# Patient Record
Sex: MALE | Race: WHITE | NOT HISPANIC OR LATINO | Employment: FULL TIME | URBAN - METROPOLITAN AREA
[De-identification: names, ages, dates, MRNs, and addresses within clinical notes are randomized per-mention and may not be internally consistent; named-entity substitution may affect disease eponyms.]

---

## 2018-12-24 ENCOUNTER — HOSPITAL ENCOUNTER (OUTPATIENT)
Dept: RADIOLOGY | Facility: MEDICAL CENTER | Age: 33
End: 2018-12-24

## 2018-12-24 ENCOUNTER — HOSPITAL ENCOUNTER (EMERGENCY)
Facility: MEDICAL CENTER | Age: 33
End: 2018-12-24
Attending: EMERGENCY MEDICINE
Payer: COMMERCIAL

## 2018-12-24 ENCOUNTER — APPOINTMENT (OUTPATIENT)
Dept: RADIOLOGY | Facility: MEDICAL CENTER | Age: 33
End: 2018-12-24
Attending: EMERGENCY MEDICINE
Payer: COMMERCIAL

## 2018-12-24 VITALS
RESPIRATION RATE: 11 BRPM | WEIGHT: 238 LBS | HEIGHT: 73 IN | DIASTOLIC BLOOD PRESSURE: 65 MMHG | OXYGEN SATURATION: 95 % | TEMPERATURE: 97.5 F | SYSTOLIC BLOOD PRESSURE: 108 MMHG | HEART RATE: 111 BPM | BODY MASS INDEX: 31.54 KG/M2

## 2018-12-24 DIAGNOSIS — S82.862A CLOSED DISPLACED MAISONNEUVE FRACTURE OF LEFT LOWER EXTREMITY, INITIAL ENCOUNTER: ICD-10-CM

## 2018-12-24 DIAGNOSIS — S82.892A CLOSED FRACTURE DISLOCATION OF LEFT ANKLE, INITIAL ENCOUNTER: ICD-10-CM

## 2018-12-24 PROCEDURE — 700111 HCHG RX REV CODE 636 W/ 250 OVERRIDE (IP): Performed by: EMERGENCY MEDICINE

## 2018-12-24 PROCEDURE — 99153 MOD SED SAME PHYS/QHP EA: CPT

## 2018-12-24 PROCEDURE — 27840 TREAT ANKLE DISLOCATION: CPT

## 2018-12-24 PROCEDURE — 73590 X-RAY EXAM OF LOWER LEG: CPT | Mod: LT

## 2018-12-24 PROCEDURE — 96374 THER/PROPH/DIAG INJ IV PUSH: CPT

## 2018-12-24 PROCEDURE — 99285 EMERGENCY DEPT VISIT HI MDM: CPT

## 2018-12-24 PROCEDURE — 700102 HCHG RX REV CODE 250 W/ 637 OVERRIDE(OP): Performed by: EMERGENCY MEDICINE

## 2018-12-24 PROCEDURE — 94770 HCHG CO2 EXPIRED GAS DETERMINATION: CPT

## 2018-12-24 PROCEDURE — 99152 MOD SED SAME PHYS/QHP 5/>YRS: CPT

## 2018-12-24 PROCEDURE — A9270 NON-COVERED ITEM OR SERVICE: HCPCS | Performed by: EMERGENCY MEDICINE

## 2018-12-24 RX ORDER — HYDROMORPHONE HYDROCHLORIDE 1 MG/ML
1 INJECTION, SOLUTION INTRAMUSCULAR; INTRAVENOUS; SUBCUTANEOUS ONCE
Status: COMPLETED | OUTPATIENT
Start: 2018-12-24 | End: 2018-12-24

## 2018-12-24 RX ORDER — HYDROCODONE BITARTRATE AND ACETAMINOPHEN 10; 325 MG/1; MG/1
1 TABLET ORAL ONCE
Status: COMPLETED | OUTPATIENT
Start: 2018-12-24 | End: 2018-12-24

## 2018-12-24 RX ORDER — HYDROCODONE BITARTRATE AND ACETAMINOPHEN 10; 325 MG/1; MG/1
1 TABLET ORAL EVERY 6 HOURS PRN
Qty: 21 TAB | Refills: 0 | Status: SHIPPED | OUTPATIENT
Start: 2018-12-24 | End: 2018-12-29

## 2018-12-24 RX ADMIN — PROPOFOL 200 MG: 10 INJECTION, EMULSION INTRAVENOUS at 03:00

## 2018-12-24 RX ADMIN — HYDROMORPHONE HYDROCHLORIDE 1 MG: 1 INJECTION, SOLUTION INTRAMUSCULAR; INTRAVENOUS; SUBCUTANEOUS at 02:36

## 2018-12-24 RX ADMIN — HYDROCODONE BITARTRATE AND ACETAMINOPHEN 1 TABLET: 10; 325 TABLET ORAL at 04:45

## 2018-12-24 ASSESSMENT — PAIN SCALES - GENERAL: PAINLEVEL_OUTOF10: 9
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
## 2018-12-24 NOTE — ED NOTES
Patient is being discharged from ED to home. Discharge instructions were discussed by RN with patient and/or Family. No questions at this time. Medications were discussed with patient. VS within normal limits or have been addressed with patient and MD.     Pt signed Narcotic Form. Discussed no drinking, driving, operating heavy machinery while on these meds.    Discussed follow-up with ortho

## 2018-12-24 NOTE — DISCHARGE INSTRUCTIONS
You have a posterior malleolar fracture of the distal tibia and HAD a dislocation at the ankle which was reduced  You also have a midshaft fibular fracture    All together this is called a maisonneuve fracture - which means that the ligament between the tibia and fibula was injured and you may need surgical treatment

## 2018-12-24 NOTE — FLOWSHEET NOTE
12/24/18 0358   Chest Exam   Respiration 16   Pulse (!) 113   Heart Rate (Monitored) (!) 115   Oxygen   Pulse Oximetry 99 %   Events   Conscious Sedation Smart Text Completed? Yes

## 2018-12-24 NOTE — CONSULTS
DATE OF SERVICE:  12/24/2018    REQUESTING PHYSICIAN:  Dr. Mccauley.    CHIEF COMPLAINT:  Left ankle pain.    HISTORY:  The patient is 33 years old.  He lives in [place].  He was up   in [place] visiting some friends wrestling, got his leg caught and then rolled   over his leg.  He had pain and deformity, was seen in the emergency room,   found to have a fracture dislocation of the ankle, which was just reduced.    Orthopedic consultation was requested.    ALLERGIES:  None.    MEDICATIONS:  Inhaler p.r.n.    PAST MEDICAL HISTORY:  Asthma.    PAST SURGICAL HISTORY:  Right shoulder surgery.    SOCIAL HISTORY:  The patient does not smoke, drinks alcohol occasionally, does   not use any drugs, lives in [facility] [place].    FAMILY HISTORY:  Negative.    REVIEW OF SYSTEMS:  No loss of conscious, nausea, vomiting, diarrhea,   constipation, polyuria, dysuria, fevers, chills, weight loss, weight gain,   abdominal pain, chest pain or shortness of breath.    PHYSICAL EXAMINATION:  GENERAL:  The patient is in no acute distress.  Alert and oriented.  VITAL SIGNS:  His most recent blood pressure 117/77, heart rate 124,   respirations 25, temperature on presentation 97.5.  HEENT:  Normocephalic, atraumatic.  NECK:  Supple, nontender.  CHEST AND ABDOMEN:  Nontender.  No labored breathing.  EXTREMITIES:  Right lower and bilateral upper extremities without tenderness   or deformity.  Left lower extremity is in a splint.  Reportedly, skin is   intact.  He is able to dorsiflex and plantarflex his toes.  The toes are warm.    DIAGNOSTIC DATA:  Injury radiographs show a dislocation of the talus with   posterior malleolus fracture and widening of the distal tib-fib joint.  There   is a spiral displaced fracture of the midshaft of the fibula.    ASSESSMENT:  Left fibula shaft fracture with posterior malleolus fracture and   syndesmotic disruption, closed.    PLAN:  We will see what post-reduction radiographs look like.  If things  are   well aligned, we will let the patient go home on a baby aspirin once a day,   nonweightbearing, elevation with crutches and follow up in the [facility]   on 12/26, he will need surgical treatment.       ____________________________________     MD NAVA ARMIJO / [facility]    DD:  12/24/2018 04:11:48  DT:  12/24/2018 04:28:55    D#:  [id number]  Job#:  [id number]

## 2018-12-24 NOTE — ED NOTES
Conscious sedation. Emergency equip at bedside, MD and RT at bedside, on monitor Q.5.min, consents done.

## 2019-12-11 ENCOUNTER — HOSPITAL ENCOUNTER (OUTPATIENT)
Dept: RADIOLOGY | Facility: MEDICAL CENTER | Age: 34
End: 2019-12-11
Attending: ORTHOPAEDIC SURGERY